# Patient Record
Sex: MALE | Race: WHITE
[De-identification: names, ages, dates, MRNs, and addresses within clinical notes are randomized per-mention and may not be internally consistent; named-entity substitution may affect disease eponyms.]

---

## 2017-09-13 ENCOUNTER — HOSPITAL ENCOUNTER (INPATIENT)
Dept: HOSPITAL 56 - MW.ED | Age: 32
LOS: 1 days | Discharge: NURSING, REHAB OR LONG-TERM CARE | DRG: 241 | End: 2017-09-14
Attending: INTERNAL MEDICINE | Admitting: INTERNAL MEDICINE
Payer: COMMERCIAL

## 2017-09-13 DIAGNOSIS — Z79.899: ICD-10-CM

## 2017-09-13 DIAGNOSIS — K21.9: ICD-10-CM

## 2017-09-13 DIAGNOSIS — E88.01: ICD-10-CM

## 2017-09-13 DIAGNOSIS — D50.0: ICD-10-CM

## 2017-09-13 DIAGNOSIS — R55: ICD-10-CM

## 2017-09-13 DIAGNOSIS — K29.81: Primary | ICD-10-CM

## 2017-09-13 LAB
CHLORIDE SERPL-SCNC: 110 MMOL/L (ref 98–110)
SODIUM SERPL-SCNC: 137 MMOL/L (ref 136–146)

## 2017-09-13 PROCEDURE — 30233N1 TRANSFUSION OF NONAUTOLOGOUS RED BLOOD CELLS INTO PERIPHERAL VEIN, PERCUTANEOUS APPROACH: ICD-10-PCS | Performed by: INTERNAL MEDICINE

## 2017-09-13 PROCEDURE — C9113 INJ PANTOPRAZOLE SODIUM, VIA: HCPCS

## 2017-09-13 PROCEDURE — P9016 RBC LEUKOCYTES REDUCED: HCPCS

## 2017-09-13 NOTE — EDM.PDOC
ED HPI GENERAL MEDICAL PROBLEM





- General


Chief Complaint: Abdominal Pain


Stated Complaint: stomach ulcers


Time Seen by Provider: 09/13/17 08:23





- History of Present Illness


INITIAL COMMENTS - FREE TEXT/NARRATIVE: 





HISTORY AND PHYSICAL:





History of present illness:


The patient is a 32-year-old male with a history of peptic ulcer disease since 

2007 and has had multiple endoscopies since that time and has had a blood 

transfusion and presents with complaints of black/maroon stools that started on 

Sunday, 3 days ago. The patient was seen in our clinic 2 days ago for similar 

symptoms and had a hemoglobin of 10.1. He has had multiple endoscopies at a 

variety of locations including [facility] in [place] here at [facility] 

and at the [facility]. He had a pill study performed earlier this year in 

January at [facility] and says that his provider in the clinic has all those 

results. He is currently on a regimen of omeprazole and sucralfate. The patient 

does not drink caffeinated products or take aspirin based products and does not 

drink alcohol. The patient says that starting on Sunday he had dark stools 

which when they were in the water in the toilet had blood from them. He's had 

no nausea or vomiting and no abdominal pain with this. He's had no chest pain 

or shortness of breath but today he was very sweaty and then he had a brief 

syncopal event. Patient denies any trauma as a result of the syncopal event. 

Patient has only had one bowel movement a day until yesterday and he had 2 

bowel movements and it is not diarrhea. Patient denies any neurosensory changes 

or weakness in his extremities but he says he looks very pale and he feels very 

weak in a generalized fashion. As a result of the syncope has no head neck or 

back pain and no extremity complaints. Currently in the ED laying in the bed he 

feels asymptomatic except for just some generalized weakness





Review of systems: 


As per history of present illness and below otherwise all systems reviewed and 

negative.





Past medical history: 


As per history of present illness and as reviewed below otherwise 

noncontributory.





Surgical history: 


As per history of present illness and as reviewed below otherwise 

noncontributory.





Social history: 


No reported history of drug or alcohol abuse.





Family history: 


As per history of present illness and as reviewed below otherwise 

noncontributory.





Physical exam:


Gen.: Well-developed well-nourished man who is very tall and lean and very pale 

appearing. He speaking clearly and easily in the ED and vital signs have been 

noted by me.


HEENT: Atraumatic, normocephalic, pupils reactive, negative for scleral icterus

, there is profound conjunctival pallor, mucous membranes moist, throat clear, 

neck supple, nontender, trachea midline. There are no midline step-offs in his 

defects of the cervical spine


Lungs: Clear to auscultation, breath sounds equal bilaterally, chest nontender.


Heart: S1S2, regular rhythm and slightly tachycardic rate on my evaluation, 

negative for clicks, rubs, or JVD.


Abdomen: Soft, nondistended, nontender. There is no rebound or guarding and 

bowel sounds are normoactive. There is absolutely no tenderness on palpation. 

Negative for masses or hepatosplenomegaly. Negative for costovertebral 

tenderness.


Pelvis: Stable nontender.


Genitourinary: Deferred.


Rectal: Deferred.


Extremities: Atraumatic, negative for cords or calf pain. Neurovascular 

unremarkable.


Neuro: Awake, alert, oriented. Cranial nerves II through XII unremarkable. 

Cerebellum unremarkable. Motor and sensory unremarkable throughout. Exam 

nonfocal.


Skin: Normal turgor, very pale in coloration and no rashes or lesions. There is 

no evidence of any soft tissue injury visible on the trunk or the extremities/

head and neck as a result of the syncope


Back: There are no midline step-offs in his defects of the thoracic or lumbar 

spine and no evidence of any abrasions contusions or skin changes


Diagnostics:


EKG, CBC CMP INR troponin chest x-ray type and screen/type and cross





Therapeutics:


IV O2 monitor IV fluids protonic bolus protonic drip blood transfusion





0910: This case was discussed with our surgeon on call Dr. Figueroa who feels 

that the patient can remain here, be admitted to the hospitalist service, 

received blood transfusion and he will plan on doing an endoscopy. He will do a 

formal consult later today. The patient is aware of this conversation and the 

care plan and is comfortable with that. I will discuss the case with the 

hospitalist.


0955: Dr. Byers is in the ER evaluating the patient and accepts them for 

inpatient admission to telemetry.





Critical care time excluding procedures:31min


Impression: 


Upper GI bleed with history of same, symptomatic anemia with syncope





Definitive disposition and diagnosis as appropriate pending reevaluation and 

review of above.





- Related Data


 Allergies











Allergy/AdvReac Type Severity Reaction Status Date / Time


 


No Known Allergies Allergy   Verified 09/13/17 08:17











Home Meds: 


 Home Meds





Albuterol Sulfate [Proair Hfa] 2 puff INH QID PRN 01/17/17 [History]


Omeprazole 40 mg PO DAILY 01/17/17 [History]


Sucralfate 1 tab PO QID 01/17/17 [History]











Past Medical History


HEENT History: Reports: Other (See Below)


Other HEENT History: wears glasses,  has upper and lower dentures (does not wear

)


Other Cardiovascular History: recent tachycardia from anemia


Respiratory History: Reports: SOB, Other (See Below)


Other Respiratory History: Alpha-1-antitrypsin deficiency,   has been SOB from 

recent anemia


Gastrointestinal History: Reports: GERD, PUD


Genitourinary History: Reports: None


Musculoskeletal History: Reports: None


Neurological History: Reports: None


Psychiatric History: Reports: None


Endocrine/Metabolic History: Reports: None


Hematologic History: Reports: Anemia, Blood Transfusion(s)


Other Hematologic History: recently had 2 units transfused


Immunologic History: Reports: None


Oncologic (Cancer) History: Reports: None


Dermatologic History: Reports: None





- Past Surgical History


Head Surgeries/Procedures: Reports: None


HEENT Surgical History: Reports: None


Cardiovascular Surgical History: Reports: None


Respiratory Surgical History: Reports: None


GI Surgical History: Reports: EGD


Male  Surgical History: Reports: None


Endocrine Surgical History: Reports: None


Neurological Surgical History: Reports: None


Musculoskeletal Surgical History: Reports: None


Oncologic Surgical History: Reports: None





Social & Family History





- Tobacco Use


Smoking Status *Q: Never Smoker





- Recreational Drug Use


Recreational Drug Use: No


Drug Use in Last 12 Months: No





ED ROS GENERAL





- Review of Systems


Review Of Systems: ROS reveals no pertinent complaints other than HPI.





ED EXAM, GENERAL





- Physical Exam


Exam: See Below (See dictation)





Course





- Vital Signs


Last Recorded V/S: 


 Last Vital Signs











Temp  36.0 C   09/13/17 08:18


 


Pulse  102 H  09/13/17 08:18


 


Resp  14   09/13/17 09:30


 


BP  111/65   09/13/17 09:30


 


Pulse Ox  100   09/13/17 09:30














- Orders/Labs/Meds


Orders: 


 Active Orders 24 hr











 Category Date Time Status


 


 Patient Status [ADT] Stat ADT  09/13/17 09:57 Ordered


 


 Cardiac Monitoring [RC] .AS DIRECTED Care  09/13/17 08:24 Active


 


 EKG Documentation Completion [RC] STAT Care  09/13/17 08:24 Active


 


 Notify Provider Consults [RC] ASDIRECTED Care  09/13/17 09:29 Active


 


 Oxygen Therapy, ED [RC] ASDIRECTED Care  09/13/17 08:24 Active


 


 Pulse Oximetry [RC] ASDIRECTED Care  09/13/17 08:24 Active


 


 Consult to Physician [CONS] Stat Cons  09/13/17 09:29 Active


 


 RED BLOOD CELLS LP [BBK] Stat Lab  09/13/17 08:45 Results


 


 TYPE AND SCREEN [BBK] Stat Lab  09/13/17 08:45 Results


 


 Pantoprazole [ProTONIX IV***] 80 mg Med  09/13/17 09:15 Active





 Sodium Chloride 0.9% [Normal Saline] 100 ml   





 IV Q10H   


 


 Sodium Chloride 0.9% [Saline Flush] Med  09/13/17 08:24 Active





 10 ml FLUSH ASDIRECTED PRN   


 


 Sodium Chloride 0.9% [Saline Flush] Med  09/13/17 08:24 Active





 2.5 ml FLUSH ASDIRECTED PRN   


 


 Saline Lock Insert [OM.PC] Stat Oth  09/13/17 08:24 Ordered


 


 Transfuse Red Blood Cells [COMM] Stat Oth  09/13/17 09:09 Ordered








 Medication Orders





Pantoprazole Sodium 80 mg/ (Sodium Chloride)  100 mls @ 10 mls/hr IV Q10H JEMMA


   Last Admin: 09/13/17 09:23  Dose: 10 mls/hr


Sodium Chloride (Saline Flush)  10 ml FLUSH ASDIRECTED PRN


   PRN Reason: Keep Vein Open


Sodium Chloride (Saline Flush)  2.5 ml FLUSH ASDIRECTED PRN


   PRN Reason: Keep Vein Open








Labs: 


 Laboratory Tests











  09/13/17 09/13/17 09/13/17 Range/Units





  08:45 08:45 08:45 


 


WBC  8.47    (4.0-11.0)  K/uL


 


RBC  2.16 L    (4.50-5.90)  M/uL


 


Hgb  6.6 L    (13.0-17.0)  g/dL


 


Hct  19.5 L    (38.0-50.0)  %


 


MCV  90.3    (80.0-98.0)  fL


 


MCH  30.6    (27.0-32.0)  pg


 


MCHC  33.8    (31.0-37.0)  g/dL


 


RDW Std Deviation  45.7    (28.0-62.0)  fl


 


RDW Coeff of Jairo  14    (11.0-15.0)  %


 


Plt Count  244    (150-400)  K/uL


 


MPV  9.50    (7.40-12.00)  fL


 


Neut % (Auto)  60.3    (48.0-80.0)  %


 


Lymph % (Auto)  31.6    (16.0-40.0)  %


 


Mono % (Auto)  3.7    (0.0-15.0)  %


 


Eos % (Auto)  2.6    (0.0-7.0)  %


 


Baso % (Auto)  1.8 H    (0.0-1.5)  %


 


Neut # (Auto)  5.1    (1.4-5.7)  K/uL


 


Lymph # (Auto)  2.7 H    (0.6-2.4)  K/uL


 


Mono # (Auto)  0.3    (0.0-0.8)  K/uL


 


Eos # (Auto)  0.2    (0.0-0.7)  K/uL


 


Baso # (Auto)  0.2 H    (0.0-0.1)  K/uL


 


Nucleated RBC %  0.0    /100WBC


 


Nucleated RBCs #  0    K/uL


 


INR   1.06   (0.86-1.11)  


 


Sodium    137  (136-146)  mmol/L


 


Potassium    3.7  (3.5-5.1)  mmol/L


 


Chloride    110  ()  mmol/L


 


Carbon Dioxide    22  (21-31)  mmol/L


 


BUN    37 H  (6.0-23.0)  mg/dL


 


Creatinine    0.9  (0.6-1.5)  mg/dL


 


Est Cr Clr Drug Dosing    138.23  mL/min


 


Estimated GFR (MDRD)    > 60.0  ml/min


 


Glucose    157 H  ()  mg/dL


 


Calcium    7.9 L  (8.8-10.8)  mg/dL


 


Total Bilirubin    0.2  (0.1-1.5)  mg/dL


 


AST    11  (5-40)  IU/L


 


ALT    10  (8-54)  IU/L


 


Alkaline Phosphatase    31 L  ()  


 


Troponin I     (0.0-0.29)  NG/ML


 


Total Protein    5.4 L  (6.0-8.0)  g/dL


 


Albumin    3.1 L  (3.5-5.0)  g/dL


 


Globulin    2.3  (2.0-3.5)  g/dL


 


Albumin/Globulin Ratio    1.4  (1.3-2.8)  


 


Blood Type     


 


Antibody Screen     


 


Crossmatch     














  09/13/17 09/13/17 Range/Units





  08:45 08:45 


 


WBC    (4.0-11.0)  K/uL


 


RBC    (4.50-5.90)  M/uL


 


Hgb    (13.0-17.0)  g/dL


 


Hct    (38.0-50.0)  %


 


MCV    (80.0-98.0)  fL


 


MCH    (27.0-32.0)  pg


 


MCHC    (31.0-37.0)  g/dL


 


RDW Std Deviation    (28.0-62.0)  fl


 


RDW Coeff of Jairo    (11.0-15.0)  %


 


Plt Count    (150-400)  K/uL


 


MPV    (7.40-12.00)  fL


 


Neut % (Auto)    (48.0-80.0)  %


 


Lymph % (Auto)    (16.0-40.0)  %


 


Mono % (Auto)    (0.0-15.0)  %


 


Eos % (Auto)    (0.0-7.0)  %


 


Baso % (Auto)    (0.0-1.5)  %


 


Neut # (Auto)    (1.4-5.7)  K/uL


 


Lymph # (Auto)    (0.6-2.4)  K/uL


 


Mono # (Auto)    (0.0-0.8)  K/uL


 


Eos # (Auto)    (0.0-0.7)  K/uL


 


Baso # (Auto)    (0.0-0.1)  K/uL


 


Nucleated RBC %    /100WBC


 


Nucleated RBCs #    K/uL


 


INR    (0.86-1.11)  


 


Sodium    (136-146)  mmol/L


 


Potassium    (3.5-5.1)  mmol/L


 


Chloride    ()  mmol/L


 


Carbon Dioxide    (21-31)  mmol/L


 


BUN    (6.0-23.0)  mg/dL


 


Creatinine    (0.6-1.5)  mg/dL


 


Est Cr Clr Drug Dosing    mL/min


 


Estimated GFR (MDRD)    ml/min


 


Glucose    ()  mg/dL


 


Calcium    (8.8-10.8)  mg/dL


 


Total Bilirubin    (0.1-1.5)  mg/dL


 


AST    (5-40)  IU/L


 


ALT    (8-54)  IU/L


 


Alkaline Phosphatase    ()  


 


Troponin I  < 0.10   (0.0-0.29)  NG/ML


 


Total Protein    (6.0-8.0)  g/dL


 


Albumin    (3.5-5.0)  g/dL


 


Globulin    (2.0-3.5)  g/dL


 


Albumin/Globulin Ratio    (1.3-2.8)  


 


Blood Type   A POSITIVE  


 


Antibody Screen   NEGATIVE  


 


Crossmatch   See Detail  











Meds: 


Medications











Generic Name Dose Route Start Last Admin





  Trade Name Freq  PRN Reason Stop Dose Admin


 


Pantoprazole Sodium 80 mg/  100 mls @ 10 mls/hr  09/13/17 09:15  09/13/17 09:23





  Sodium Chloride  IV   10 mls/hr





  Q10H JEMMA   Administration


 


Sodium Chloride  10 ml  09/13/17 08:24  





  Saline Flush  FLUSH   





  ASDIRECTED PRN   





  Keep Vein Open   


 


Sodium Chloride  2.5 ml  09/13/17 08:24  





  Saline Flush  FLUSH   





  ASDIRECTED PRN   





  Keep Vein Open   














Discontinued Medications














Generic Name Dose Route Start Last Admin





  Trade Name Freq  PRN Reason Stop Dose Admin


 


Sodium Chloride  1,000 mls @ 999 mls/hr  09/13/17 08:24  09/13/17 08:35





  Normal Saline  IV  09/13/17 09:24  999 mls/hr





  STAT ONE   Administration


 


Pantoprazole Sodium 80 mg/  100 mls @ 10 mls/hr  09/13/17 08:30  





  Sodium Chloride  IV   





  .Continuous JEMMA   


 


Pantoprazole Sodium  80 mg  09/13/17 08:24  09/13/17 09:01





  Protonix Iv***  IVPUSH  09/13/17 08:25  80 mg





  .BOLUS ONE   Administration














Departure





- Departure


Time of Disposition: 10:00


Disposition: Admitted As Inpatient 64


Condition: Good


Clinical Impression: 


 Upper GI bleeding, Symptomatic anemia








- Discharge Information


Referrals: 


Nahun Thayer MD [Primary Care Provider] - 


Forms:  ED Department Discharge





- My Orders


Last 24 Hours: 


My Active Orders





09/13/17 08:24


Cardiac Monitoring [RC] .AS DIRECTED 


EKG Documentation Completion [RC] STAT 


Oxygen Therapy, ED [RC] ASDIRECTED 


Pulse Oximetry [RC] ASDIRECTED 


Sodium Chloride 0.9% [Saline Flush]   10 ml FLUSH ASDIRECTED PRN 


Sodium Chloride 0.9% [Saline Flush]   2.5 ml FLUSH ASDIRECTED PRN 


Saline Lock Insert [OM.PC] Stat 





09/13/17 08:45


RED BLOOD CELLS LP [BBK] Stat 


TYPE AND SCREEN [BBK] Stat 





09/13/17 09:09


Transfuse Red Blood Cells [COMM] Stat 





09/13/17 09:15


Pantoprazole [ProTONIX IV***] 80 mg   Sodium Chloride 0.9% [Normal Saline] 100 

ml IV Q10H 





09/13/17 09:29


Notify Provider Consults [RC] ASDIRECTED 


Consult to Physician [CONS] Stat 





09/13/17 09:57


Patient Status [ADT] Stat 














- Assessment/Plan


Last 24 Hours: 


My Active Orders





09/13/17 08:24


Cardiac Monitoring [RC] .AS DIRECTED 


EKG Documentation Completion [RC] STAT 


Oxygen Therapy, ED [RC] ASDIRECTED 


Pulse Oximetry [RC] ASDIRECTED 


Sodium Chloride 0.9% [Saline Flush]   10 ml FLUSH ASDIRECTED PRN 


Sodium Chloride 0.9% [Saline Flush]   2.5 ml FLUSH ASDIRECTED PRN 


Saline Lock Insert [OM.PC] Stat 





09/13/17 08:45


RED BLOOD CELLS LP [BBK] Stat 


TYPE AND SCREEN [BBK] Stat 





09/13/17 09:09


Transfuse Red Blood Cells [COMM] Stat 





09/13/17 09:15


Pantoprazole [ProTONIX IV***] 80 mg   Sodium Chloride 0.9% [Normal Saline] 100 

ml IV Q10H 





09/13/17 09:29


Notify Provider Consults [RC] ASDIRECTED 


Consult to Physician [CONS] Stat 





09/13/17 09:57


Patient Status [ADT] Stat

## 2017-09-13 NOTE — PCM.HP
H&P History of Present Illness





- General


Admit Problem/Dx: 


 Admission Diagnosis/Problem





Admission Diagnosis/Problem      Upper gastrointestinal hemorrhage





Hemoglobin 6.6 g/dL, syncopal episode


Source of Information: Patient, Family


History Limitations: Reports: No Limitations





- History of Present Illness


Onset of Symptoms: Reports: Gradual


Duration of Symptoms: Reports: Day(s):, Chronic


Location: Reports: Abdomen


Severity: Mild


Improves with: Reports: None


Worsens with: Reports: None


Associated Symptoms: Reports: Syncope, Other (Dizziness)





- Related Data


Allergies/Adverse Reactions: 


 Allergies











Allergy/AdvReac Type Severity Reaction Status Date / Time


 


No Known Allergies Allergy   Verified 09/13/17 08:17











Home Medications: 


 Home Meds





Albuterol Sulfate [Proair Hfa] 2 puff INH QID PRN 01/17/17 [History]


Omeprazole 40 mg PO DAILY 01/17/17 [History]


Sucralfate 1 tab PO QID 01/17/17 [History]











Past Medical History


HEENT History: Reports: Other (See Below)


Other HEENT History: wears glasses,  has upper and lower dentures (does not wear

)


Cardiovascular History: Reports: None


Other Cardiovascular History: recent tachycardia from anemia


Respiratory History: Reports: SOB, Other (See Below)


Other Respiratory History: Alpha-1-antitrypsin deficiency,   has been SOB from 

recent anemia


Gastrointestinal History: Reports: GERD, GI Bleed, PUD


Genitourinary History: Reports: None


Musculoskeletal History: Reports: None


Neurological History: Reports: None


Psychiatric History: Reports: None


Endocrine/Metabolic History: Reports: None


Hematologic History: Reports: Anemia, Blood Transfusion(s)


Other Hematologic History: recently had 2 units transfused


Immunologic History: Reports: None


Oncologic (Cancer) History: Reports: None


Dermatologic History: Reports: None





- Past Surgical History


Head Surgeries/Procedures: Reports: None


HEENT Surgical History: Reports: None


Cardiovascular Surgical History: Reports: None


Respiratory Surgical History: Reports: None


GI Surgical History: Reports: EGD


Male  Surgical History: Reports: None


Endocrine Surgical History: Reports: None


Neurological Surgical History: Reports: None


Musculoskeletal Surgical History: Reports: None


Oncologic Surgical History: Reports: None





Social & Family History





- Family History


Family Medical History: Noncontributory





- Tobacco Use


Smoking Status *Q: Never Smoker





- Caffeine Use


Caffeine Use: Reports: None





- Alcohol Use


Alcohol Use History: Yes


Alcohol Use Frequency: Rarely





- Recreational Drug Use


Recreational Drug Use: No


Drug Use in Last 12 Months: No





H&P Review of Systems





- Review of Systems:


Review Of Systems: See Below


General: Reports: Weakness, Fatigue


HEENT: Reports: No Symptoms


Pulmonary: Reports: Shortness of Breath


Cardiovascular: Reports: Syncope


Gastrointestinal: Reports: Black Stool, Bloody Stool, Decreased Appetite, Nausea


Genitourinary: Reports: No Symptoms


Musculoskeletal: Reports: No Symptoms


Skin: Reports: No Symptoms


Psychiatric: Reports: No Symptoms


Neurological: Reports: Dizziness


Hematologic/Lymphatic: Reports: Anemia, Easy Bleeding


Immunologic: Reports: No Symptoms





Exam





- Exam


Exam: See Below





- Vital Signs


Vital Signs: 


 Last Vital Signs











Temp  36.6 C   09/13/17 10:04


 


Pulse  102 H  09/13/17 08:18


 


Resp  18   09/13/17 10:04


 


BP  130/65   09/13/17 10:04


 


Pulse Ox  98   09/13/17 10:04











Weight: 90.718 kg





- Exam


Quality Assessment: No: Supplemental Oxygen


General: Alert, Oriented, Cooperative


HEENT: Conjunctiva Clear, EOMI.  No: Mucosa Moist & Pink (Pale conjunctiva and 

oral mucosa)


Neck: Supple, Trachea Midline


Lungs: Clear to Auscultation, Normal Respiratory Effort


Cardiovascular: Regular Rhythm, Tachycardia


GI/Abdominal Exam: Normal Bowel Sounds, Soft, Non-Tender, No Distention


Rectal (Males) Exam: Black Stool


Back Exam: Normal Inspection


Extremities: Normal Inspection, No Pedal Edema


Skin: Warm, Dry


Neurological: Cranial Nerves Intact


Neuro Extensive - Mental Status: Alert, Oriented x3


Psychiatric: Alert, Normal Affect, Normal Mood





- Patient Data


Result Diagrams: 


 09/13/17 08:45





 09/13/17 08:45





*Q Meaningful Use (ADM)





- VTE *Q


VTE Criteria *Q: 





VTE Pharmacological Contraindications *Q: Active Hemorrhage





- Stroke *Q


Stroke Criteria *Q: 








- AMI *Q


AMI Criteria *Q: 








- Problem List


(1) Duodenitis with hemorrhage


SNOMED Code(s): 14671472


   ICD Code: K29.81 - DUODENITIS WITH BLEEDING   Status: Chronic   Priority: 

High   Current Visit: Yes   





(2) Chronic hemorrhagic anemia


SNOMED Code(s): 984608177


   ICD Code: D50.0 - IRON DEFICIENCY ANEMIA SECONDARY TO BLOOD LOSS (CHRONIC)   

Status: Chronic   Priority: High   Current Visit: Yes   





(3) Syncope and collapse


SNOMED Code(s): 220784432


   ICD Code: R55 - SYNCOPE AND COLLAPSE   Status: Acute   Priority: High   

Current Visit: Yes   


Problem List Initiated/Reviewed/Updated: Yes


Orders Last 24hrs: 


 Active Orders 24 hr











 Category Date Time Status


 


 Patient Status [ADT] Routine ADT  09/13/17 10:18 Active


 


 Oxygen Therapy [RC] PRN Care  09/13/17 10:18 Active


 


 Up With Assistance [RC] ASDIRECTED Care  09/13/17 10:18 Active


 


 VTE/DVT Education [RC] PER UNIT ROUTINE Care  09/13/17 10:18 Active


 


 Vital Signs [RC] Q4H Care  09/13/17 10:18 Active


 


 Clear Liquid Diet [DIET] Diet  09/13/17 Lunch Active


 


 Acetaminophen [Tylenol] Med  09/13/17 10:18 Active





 650 mg PO Q4H PRN   


 


 Albuterol [Ventolin HFA] Med  09/13/17 10:22 Active





 8 gm INH QID PRN   


 


 Morphine Med  09/13/17 10:18 Active





 2 mg IVPUSH Q2H PRN   


 


 Ondansetron [Zofran ODT] Med  09/13/17 10:18 Active





 8 mg PO Q6H PRN   


 


 Sodium Chloride 0.9% [Normal Saline] 1,000 ml Med  09/13/17 10:30 Active





 IV ASDIRECTED   


 


 Temazepam [Restoril] Med  09/13/17 10:18 Active





 15 mg PO BEDTIME PRN   


 


 oxyCODONE Med  09/13/17 10:18 Active





 5 mg PO Q4H PRN   


 


 VTE Pharmacological Contraindications [AST] Per Unit Oth  09/13/17 10:18 

Ordered





 Routine   


 


 Resuscitation Status Routine Resus Stat  09/13/17 10:18 Ordered








 Medication Orders





Acetaminophen (Tylenol)  650 mg PO Q4H PRN


   PRN Reason: Pain (Mild 1-3)/fever


Albuterol (Ventolin Hfa)  8 gm INH QID PRN


   PRN Reason: Shortness of Breath


Pantoprazole Sodium 80 mg/ (Sodium Chloride)  100 mls @ 10 mls/hr IV Q10H JEMMA


   Last Admin: 09/13/17 09:23  Dose: 10 mls/hr


Sodium Chloride (Normal Saline)  1,000 mls @ 125 mls/hr IV ASDIRECTED JEMMA


Morphine Sulfate (Morphine)  2 mg IVPUSH Q2H PRN


   PRN Reason: Pain (severe 7-10)


   Stop: 09/14/17 10:21


Ondansetron HCl (Zofran Odt)  8 mg PO Q6H PRN


   PRN Reason: nausea, able to take PO


Oxycodone HCl (Oxycodone)  5 mg PO Q4H PRN


   PRN Reason: Pain (moderate 4-6)


Sodium Chloride (Saline Flush)  10 ml FLUSH ASDIRECTED PRN


   PRN Reason: Keep Vein Open


Sodium Chloride (Saline Flush)  2.5 ml FLUSH ASDIRECTED PRN


   PRN Reason: Keep Vein Open


Temazepam (Restoril)  15 mg PO BEDTIME PRN


   PRN Reason: Sleep








Assessment/Plan Comment:: 





The patient is a 32-year-old gentleman who is presented primarily out of 

concern for syncopal episode. The patient's hemoglobin on presentation to the 

emergency department was 6.6 g/dL. Surgeon, Dr. Figueroa, has been consulted. 

The patient will be admitted as an inpatient on telemetry. He'll be transfused 

with 2 units packed red blood cells. The patient will likely have possible 

intervention as far as endoscopy and/or colonoscopy. The patient will be 

maintained on proton pump inhibitor drip. I have also reviewed the patient's 

previous capsule endoscopy which was obtained in January 2017 which shows 

severe ulcerative duodenitis in the first portion of duodenum with a healing 

ulcer. Patient says that he has been compliant with his medications. The 

patient will also be maintained on fluids and a clear liquid diet for now. The 

patient's hemoglobin and hematocrit will be monitored for further drops in his 

hemoglobin and he'll be transfused as needed.

## 2017-09-13 NOTE — PCM.CONS
H&P History of Present Illness





- General


Date of Service: 09/13/17


Admit Problem/Dx: 


 Admission Diagnosis/Problem





Admission Diagnosis/Problem      Upper gastrointestinal hemorrhage





Hemoglobin 6.6 g/dL, syncopal episode


Source of Information: Patient


History Limitations: Reports: No Limitations





- History of Present Illness


Onset of Symptoms: Reports: Gradual


Symptom Onset Date: 09/10/17


Location: Reports: Abdomen


Quality: Reports: Same as Previous Episode


Severity: Moderate


Improves with: Reports: None


Worsens with: Reports: None


Context: Reports: Sick Contact


Associated Symptoms: Reports: Nausea/Vomiting, Other (Hematemesis, melena)





- Related Data


Allergies/Adverse Reactions: 


 Allergies











Allergy/AdvReac Type Severity Reaction Status Date / Time


 


No Known Allergies Allergy   Verified 09/13/17 08:17











Home Medications: 


 Home Meds





Albuterol Sulfate [Proair Hfa] 2 puff INH QID PRN 01/17/17 [History]


Omeprazole 40 mg PO DAILY 01/17/17 [History]


Sucralfate 1 tab PO QID 01/17/17 [History]











Past Medical History


HEENT History: Reports: Other (See Below)


Other HEENT History: wears glasses,  has upper and lower dentures (does not wear

)


Cardiovascular History: Reports: None


Other Cardiovascular History: recent tachycardia from anemia


Respiratory History: Reports: SOB, Other (See Below)


Other Respiratory History: Alpha-1-antitrypsin deficiency,   has been SOB from 

recent anemia


Gastrointestinal History: Reports: GERD, GI Bleed, PUD


Genitourinary History: Reports: None


Musculoskeletal History: Reports: None


Neurological History: Reports: None


Psychiatric History: Reports: None


Endocrine/Metabolic History: Reports: None


Hematologic History: Reports: Anemia, Blood Transfusion(s)


Other Hematologic History: recently had 2 units transfused


Immunologic History: Reports: None


Oncologic (Cancer) History: Reports: None


Dermatologic History: Reports: None





- Past Surgical History


Head Surgeries/Procedures: Reports: None


HEENT Surgical History: Reports: None


Cardiovascular Surgical History: Reports: None


Respiratory Surgical History: Reports: None


GI Surgical History: Reports: EGD


Male  Surgical History: Reports: None


Endocrine Surgical History: Reports: None


Neurological Surgical History: Reports: None


Musculoskeletal Surgical History: Reports: None


Oncologic Surgical History: Reports: None





Social & Family History





- Family History


Family Medical History: Noncontributory





- Tobacco Use


Smoking Status *Q: Never Smoker





- Caffeine Use


Caffeine Use: Reports: None





- Recreational Drug Use


Recreational Drug Use: No


Drug Use in Last 12 Months: No





H&P Review of Systems





- Review of Systems:


Review Of Systems: See Below


General: Reports: Weakness, Fatigue.  Denies: Fever, Chills, Night Sweats, 

Diaphoresis, Weight Loss


HEENT: Reports: No Symptoms


Pulmonary: Reports: No Symptoms


Cardiovascular: Reports: No Symptoms


Gastrointestinal: Reports: Black Stool, Hematemesis, Nausea, Vomiting.  Denies: 

Constipation, Diarrhea


Genitourinary: Reports: No Symptoms


Musculoskeletal: Reports: No Symptoms


Skin: Reports: No Symptoms


Psychiatric: Reports: No Symptoms


Neurological: Reports: No Symptoms


Hematologic/Lymphatic: Reports: Anemia





Exam





- Exam


Exam: See Below





- Vital Signs


Vital Signs: 


 Last Vital Signs











Temp  98.4 F   09/13/17 10:19


 


Pulse  92   09/13/17 10:35


 


Resp  18   09/13/17 10:35


 


BP  122/60   09/13/17 10:35


 


Pulse Ox  100   09/13/17 10:35











Weight: 200 lb





- Exam


Quality Assessment: Supplemental Oxygen


General: Alert, Oriented, Cooperative, Mild Distress


HEENT: Conjunctiva Clear, Nares Patent.  No: Scleral Icterus


Neck: Supple, Trachea Midline


Lungs: Clear to Auscultation, Normal Respiratory Effort


Cardiovascular: Regular Rate, Regular Rhythm, Normal S1, Normal S2.  No: 

Tachycardia, Systolic Murmur


GI/Abdominal Exam: Normal Bowel Sounds, Soft, Non-Tender


 (Male) Exam: No Hernia


Rectal (Males) Exam: Deferred


Back Exam: Normal Inspection


Extremities: Normal Inspection


Peripheral Pulses: 4+: Posterior Tibial (L), Posterior Tibial (R), Dorsalis 

Pedis (L), Dorsalis Pedis (R)


Skin: Warm, Dry, Intact


Neurological: Cranial Nerves Intact


Psychiatric: Alert, Normal Affect, Normal Mood





- Patient Data


Result Diagrams: 


 09/13/17 08:45





 09/13/17 08:45





Consult PN Assessment/Plan


Procedures: 


Procedures





ASSAY OF IRON (02/01/17)


BLOOD TRANSFUSION SERVICE (01/12/17)


BLOOD TYPING SEROLOGIC ABO (01/12/17)


BLOOD TYPING SEROLOGIC RH(D) (01/12/17)


COMPATIBILITY TEST ANTIGLOB (01/12/17)


COMPATIBILITY TEST INCUBATE (01/12/17)


COMPATIBILITY TEST SPIN (01/12/17)


COMPLETE CBC W/AUTO DIFF WBC (09/11/17)


COMPREHEN METABOLIC PANEL (01/10/17)


EGD BIOPSY SINGLE/MULTIPLE (01/20/17)


HEMATOCRIT (01/20/17)


HEMOGLOBIN (01/20/17)


RBC ANTIBODY SCREEN (01/12/17)


ROUTINE VENIPUNCTURE (09/11/17)








(1) Symptomatic anemia


SNOMED Code(s): 635027660


   Code(s): D64.9 - ANEMIA, UNSPECIFIED   Priority: High   Current Visit: Yes   





(2) Syncope and collapse


SNOMED Code(s): 965323415


   Code(s): R55 - SYNCOPE AND COLLAPSE   Priority: High   Current Visit: Yes   





(3) Upper GI bleeding


SNOMED Code(s): 48857831


   Code(s): K92.2 - GASTROINTESTINAL HEMORRHAGE, UNSPECIFIED   Priority: High   

Current Visit: Yes   


Problem List Initiated/Reviewed/Updated: Yes


Plan: 





Esophagogastroduodenoscopy with biopsy. The operative procedure, along with the 

risks, including, but not limited to, bleeding, perforation, and the need for 

surgery were discussed with the patient who voices understanding, offers no 

questions and wishes to proceed.

## 2017-09-14 VITALS — DIASTOLIC BLOOD PRESSURE: 62 MMHG | SYSTOLIC BLOOD PRESSURE: 119 MMHG

## 2017-09-14 LAB
CHLORIDE SERPL-SCNC: 113 MMOL/L (ref 98–110)
SODIUM SERPL-SCNC: 142 MMOL/L (ref 136–146)

## 2017-09-14 PROCEDURE — 0DB68ZX EXCISION OF STOMACH, VIA NATURAL OR ARTIFICIAL OPENING ENDOSCOPIC, DIAGNOSTIC: ICD-10-PCS | Performed by: SURGERY

## 2017-09-14 NOTE — PCM.DCSUM1
**Discharge Summary





- Hospital Course


Free Text/Narrative:: 





Syncope with symptomatic anemia and upper GI bleed.





- Discharge Data


Discharge Date: 09/14/17


Discharge Disposition: DC/Tfer to Acute Hospital 02


Condition: Fair





- Discharge Diagnosis/Problem(s)


(1) Duodenitis with hemorrhage


SNOMED Code(s): 55238193


   ICD Code: K29.81 - DUODENITIS WITH BLEEDING   Status: Chronic   Priority: 

High   Current Visit: Yes   





(2) Chronic hemorrhagic anemia


SNOMED Code(s): 107935079


   ICD Code: D50.0 - IRON DEFICIENCY ANEMIA SECONDARY TO BLOOD LOSS (CHRONIC)   

Status: Chronic   Priority: High   Current Visit: Yes   





(3) Syncope and collapse


SNOMED Code(s): 637829757


   ICD Code: R55 - SYNCOPE AND COLLAPSE   Status: Acute   Priority: High   

Current Visit: Yes   





- Patient Summary/Data


Operative Procedure(s) Performed: Esophagogastroduodenoscopy with antral 

biopsies


Hospital Course: 





The patient is a 32-year-old gentleman who has a history of peptic ulcer 

disease and multiple endoscopies as well as endoscopic cameras had been 

presenting with dizziness or lightheadedness and also has a history of black 

and maroon stools. The patient came into the emergency department and was noted 

to have a hemoglobin of 6.6 g/dL. The patient has had a history of multiple 

endoscopies including colonoscopies at both [place] and at the [facility]. 

Nothing had been ever found. The patient on presentation was noted to have 

black tarry stools. Today the patient had an endoscopy performed by surgeon, 

Dr. Figueroa, which did not show any specific evidence of possible bleed. The 

patient had been transfused with 2 units packed red blood cells and his 

hemoglobin has not responded appropriately. His current hemoglobin is at 7.2 g/

dL. Patient still is having some concerns about bleeding and dark tarry stools. 

The patient previously had a syncopal episode which had brought him to the 

emergency room initially. I've recommended that the patient be transferred to 

tertiary care center for specialized care. I spoken to [facility] in [place] 

and accepting physician is Dr. Gaitan. The patient is currently hemodynamically 

stable and his vital signs remained stable. The patient will be transported via 

ground ambulance to tertiary [facility].





- Patient Instructions


Diet: NPO





- Discharge Plan


Home Medications: 


 Home Meds





Albuterol Sulfate [Proair Hfa] 2 puff INH QID PRN 01/17/17 [History]


Omeprazole 40 mg PO DAILY 01/17/17 [History]


Sucralfate 1 tab PO QID 01/17/17 [History]








Forms:  ED Department Discharge


Referrals: 


Nahun Thayer MD [Primary Care Provider] - 





- Discharge Summary/Plan Comment


DC Time >30 min.: Yes





- Patient Data


Vitals - Most Recent: 


 Last Vital Signs











Temp  36.9 C   09/14/17 08:36


 


Pulse  83   09/14/17 08:36


 


Resp  20   09/14/17 08:36


 


BP  107/57 L  09/14/17 08:36


 


Pulse Ox  100   09/14/17 08:36











Weight - Most Recent: 90.718 kg


I&O - Last 24 hours: 


 Intake & Output











 09/13/17 09/14/17 09/14/17





 22:59 06:59 14:59


 


Intake Total 1487 1116 


 


Output Total 1950 1075 


 


Balance -463 41 











Lab Results - Last 24 hrs: 


 Laboratory Results - last 24 hr











  09/14/17 09/14/17 Range/Units





  04:30 04:30 


 


WBC  5.68   (4.0-11.0)  K/uL


 


RBC  2.35 L   (4.50-5.90)  M/uL


 


Hgb  7.2 L   (13.0-17.0)  g/dL


 


Hct  21.1 L   (38.0-50.0)  %


 


MCV  89.8   (80.0-98.0)  fL


 


MCH  30.6   (27.0-32.0)  pg


 


MCHC  34.1   (31.0-37.0)  g/dL


 


RDW Std Deviation  46.7   (28.0-62.0)  fl


 


RDW Coeff of Jairo  14   (11.0-15.0)  %


 


Plt Count  189   (150-400)  K/uL


 


MPV  9.50   (7.40-12.00)  fL


 


Neut % (Auto)  53.1   (48.0-80.0)  %


 


Lymph % (Auto)  31.5   (16.0-40.0)  %


 


Mono % (Auto)  7.2   (0.0-15.0)  %


 


Eos % (Auto)  6.3   (0.0-7.0)  %


 


Baso % (Auto)  1.9 H   (0.0-1.5)  %


 


Neut # (Auto)  3.0   (1.4-5.7)  K/uL


 


Lymph # (Auto)  1.8   (0.6-2.4)  K/uL


 


Mono # (Auto)  0.4   (0.0-0.8)  K/uL


 


Eos # (Auto)  0.4   (0.0-0.7)  K/uL


 


Baso # (Auto)  0.1   (0.0-0.1)  K/uL


 


Nucleated RBC %  0.0   /100WBC


 


Nucleated RBCs #  0   K/uL


 


Sodium   142  (136-146)  mmol/L


 


Potassium   3.8  (3.5-5.1)  mmol/L


 


Chloride   113 H  ()  mmol/L


 


Carbon Dioxide   25  (21-31)  mmol/L


 


BUN   22  (6.0-23.0)  mg/dL


 


Creatinine   0.9  (0.6-1.5)  mg/dL


 


Est Cr Clr Drug Dosing   138.96  mL/min


 


Estimated GFR (MDRD)   > 60.0  ml/min


 


Glucose   102  ()  mg/dL


 


Calcium   8.0 L  (8.8-10.8)  mg/dL











Med Orders - Current: 


 Current Medications





Acetaminophen (Tylenol)  650 mg PO Q4H PRN


   PRN Reason: Pain (Mild 1-3)/fever


   Last Admin: 09/13/17 20:22 Dose:  650 mg


Albuterol (Ventolin Hfa)  8 gm INH QID PRN


   PRN Reason: Shortness of Breath


Pantoprazole Sodium 80 mg/ (Sodium Chloride)  100 mls @ 10 mls/hr IV Q10H [facility]


   Last Admin: 09/14/17 08:01 Dose:  Not Given


Sodium Chloride (Normal Saline)  1,000 mls @ 125 mls/hr IV ASDIRECTED [facility]


   Last Admin: 09/14/17 02:54 Dose:  125 mls/hr


Morphine Sulfate (Morphine)  2 mg IVPUSH Q2H PRN


   PRN Reason: Pain (severe 7-10)


Ondansetron HCl (Zofran Odt)  8 mg PO Q6H PRN


   PRN Reason: nausea, able to take PO


Oxycodone HCl (Oxycodone)  5 mg PO Q4H PRN


   PRN Reason: Pain (moderate 4-6)


Sodium Chloride (Saline Flush)  10 ml FLUSH ASDIRECTED PRN


   PRN Reason: Keep Vein Open


Sodium Chloride (Saline Flush)  2.5 ml FLUSH ASDIRECTED PRN


   PRN Reason: Keep Vein Open


Temazepam (Restoril)  15 mg PO BEDTIME PRN


   PRN Reason: Sleep





Discontinued Medications





Fentanyl (Sublimaze) Confirm Administered Dose 100 mcg .ROUTE .STK-MED ONE


   Stop: 09/14/17 07:02


Sodium Chloride (Normal Saline)  1,000 mls @ 999 mls/hr IV STAT ONE


   Stop: 09/13/17 09:24


   Last Admin: 09/13/17 08:35 Dose:  999 mls/hr


Pantoprazole Sodium 80 mg/ (Sodium Chloride)  100 mls @ 10 mls/hr IV 

.Continuous JEMMA


Lidocaine HCl (Xylocaine-Mpf 1%) Confirm Administered Dose 5 ml .ROUTE .STK-MED 

ONE


   Stop: 09/14/17 07:04


Midazolam HCl (Versed 1 Mg/Ml) Confirm Administered Dose 2 mg .ROUTE .STK-MED 

ONE


   Stop: 09/14/17 07:02


Ondansetron HCl (Zofran) Confirm Administered Dose 4 mg .ROUTE .STK-MED ONE


   Stop: 09/14/17 07:02


Pantoprazole Sodium (Protonix Iv***)  80 mg IVPUSH .BOLUS ONE


   Stop: 09/13/17 08:25


   Last Admin: 09/13/17 09:01 Dose:  80 mg


Propofol (Diprivan  20 Ml) Confirm Administered Dose 200 mg .ROUTE .STK-MED ONE


   Stop: 09/14/17 07:02


Propofol (Diprivan  20 Ml) Confirm Administered Dose 200 mg .ROUTE .STK-MED ONE


   Stop: 09/14/17 08:04











*Q Meaningful Use (DIS)





- VTE *Q


VTE Criteria *Q: 





VTE Pharmacological Contraindications *Q: Active Hemorrhage





- Stroke *Q


Stroke Criteria *Q: 








- AMI *Q


AMI Criteria *Q:

## 2017-09-14 NOTE — PCM.PREANE
Preanesthetic Assessment





- Procedure


Proposed Procedure: 





EGD





- Anesthesia/Transfusion/Family Hx


Anesthesia History: Prior Anesthesia Without Reaction


Family History of Anesthesia Reaction: No


Transfusion History: Prior Transfusion Without Reaction


Intubation History: Unknown





- Review of Systems


General: Weakness


Pulmonary: Shortness of Breath, Other (alpha 1 antitrypsin)


Cardiovascular: Other (anemic - undetermined etiology)


Neurological: No Symptoms


Other: Reports: None





- Physical Assessment


NPO Status Date: 09/13/17


NPO Status Time: 23:00


O2 Sat by Pulse Oximetry: 97


Respiratory Rate: 19


Temperature: 99.7 F


Vital Signs: 





 Last Vital Signs











Temp  98.2 F   09/14/17 02:00


 


Pulse  86   09/14/17 02:00


 


Resp  19   09/14/17 02:00


 


BP  118/62   09/14/17 02:00


 


Pulse Ox  97   09/14/17 02:00











Height: 6 ft 3.2 in


Weight: 200 lb


ASA Class: 3


Mental Status: Alert & Oriented x3


Airway Class: Mallampati = 1


Dentition: Reports: Normal Dentition


Thyro-Mental Finger Breadths: 3


Mouth Opening Finger Breadths: 3


ROM/Head Extension: Full


Lungs: Clear to Auscultation, Normal Respiratory Effort


Cardiovascular: Regular Rhythm, No Murmurs, Tachycardia





- Lab


Values: 





 Laboratory Last Values











WBC  5.68 K/uL (4.0-11.0)   09/14/17  04:30    


 


RBC  2.35 M/uL (4.50-5.90)  L  09/14/17  04:30    


 


Hgb  7.2 g/dL (13.0-17.0)  L  09/14/17  04:30    


 


Hct  21.1 % (38.0-50.0)  L  09/14/17  04:30    


 


MCV  89.8 fL (80.0-98.0)   09/14/17  04:30    


 


MCH  30.6 pg (27.0-32.0)   09/14/17  04:30    


 


MCHC  34.1 g/dL (31.0-37.0)   09/14/17  04:30    


 


RDW Std Deviation  46.7 fl (28.0-62.0)   09/14/17  04:30    


 


RDW Coeff of Jairo  14 % (11.0-15.0)   09/14/17  04:30    


 


Plt Count  189 K/uL (150-400)   09/14/17  04:30    


 


MPV  9.50 fL (7.40-12.00)   09/14/17  04:30    


 


Neut % (Auto)  53.1 % (48.0-80.0)   09/14/17  04:30    


 


Lymph % (Auto)  31.5 % (16.0-40.0)   09/14/17  04:30    


 


Mono % (Auto)  7.2 % (0.0-15.0)   09/14/17  04:30    


 


Eos % (Auto)  6.3 % (0.0-7.0)   09/14/17  04:30    


 


Baso % (Auto)  1.9 % (0.0-1.5)  H  09/14/17  04:30    


 


Neut # (Auto)  3.0 K/uL (1.4-5.7)   09/14/17  04:30    


 


Lymph # (Auto)  1.8 K/uL (0.6-2.4)   09/14/17  04:30    


 


Mono # (Auto)  0.4 K/uL (0.0-0.8)   09/14/17  04:30    


 


Eos # (Auto)  0.4 K/uL (0.0-0.7)   09/14/17  04:30    


 


Baso # (Auto)  0.1 K/uL (0.0-0.1)   09/14/17  04:30    


 


Nucleated RBC %  0.0 /100WBC  09/14/17  04:30    


 


Nucleated RBCs #  0 K/uL  09/14/17  04:30    


 


INR  1.06  (0.86-1.11)   09/13/17  08:45    


 


Sodium  142 mmol/L (136-146)   09/14/17  04:30    


 


Potassium  3.8 mmol/L (3.5-5.1)   09/14/17  04:30    


 


Chloride  113 mmol/L ()  H  09/14/17  04:30    


 


Carbon Dioxide  25 mmol/L (21-31)   09/14/17  04:30    


 


BUN  22 mg/dL (6.0-23.0)   09/14/17  04:30    


 


Creatinine  0.9 mg/dL (0.6-1.5)   09/14/17  04:30    


 


Est Cr Clr Drug Dosing  138.96 mL/min  09/14/17  04:30    


 


Estimated GFR (MDRD)  > 60.0 ml/min  09/14/17  04:30    


 


Glucose  102 mg/dL ()   09/14/17  04:30    


 


Calcium  8.0 mg/dL (8.8-10.8)  L  09/14/17  04:30    


 


Total Bilirubin  0.2 mg/dL (0.1-1.5)   09/13/17  08:45    


 


AST  11 IU/L (5-40)   09/13/17  08:45    


 


ALT  10 IU/L (8-54)   09/13/17  08:45    


 


Alkaline Phosphatase  31  ()  L  09/13/17  08:45    


 


Troponin I  < 0.10 NG/ML (0.0-0.29)   09/13/17  08:45    


 


Total Protein  5.4 g/dL (6.0-8.0)  L  09/13/17  08:45    


 


Albumin  3.1 g/dL (3.5-5.0)  L  09/13/17  08:45    


 


Globulin  2.3 g/dL (2.0-3.5)   09/13/17  08:45    


 


Albumin/Globulin Ratio  1.4  (1.3-2.8)   09/13/17  08:45    


 


Blood Type  A POSITIVE   09/13/17  08:45    


 


Antibody Screen  NEGATIVE   09/13/17  08:45    


 


Crossmatch  See Detail   09/13/17  08:45    














- Allergies


Allergies/Adverse Reactions: 


 Allergies











Allergy/AdvReac Type Severity Reaction Status Date / Time


 


No Known Allergies Allergy   Verified 09/13/17 08:17














- Blood


Blood Available: Yes


Product(s) Available: PRBC





- Anesthesia Plan


Pre-Op Medication Ordered: None





- Acknowledgements


Anesthesia Type Planned: MAC


Pt an Appropriate Candidate for the Planned Anesthesia: Yes


Alternatives and Risks of Anesthesia Discussed w Pt/Guardian: Yes


Pt/Guardian Understands and Agrees with Anesthesia Plan: Yes





PreAnesthesia Questionnaire


HEENT History: Reports: Other (See Below)


Other HEENT History: wears glasses,  has upper and lower dentures (does not wear

)


Cardiovascular History: Reports: None


Other Cardiovascular History: recent tachycardia from anemia


Respiratory History: Reports: SOB, Other (See Below)


Other Respiratory History: Alpha-1-antitrypsin deficiency,   has been SOB from 

recent anemia


Gastrointestinal History: Reports: GERD, GI Bleed, PUD


Genitourinary History: Reports: None


Musculoskeletal History: Reports: None


Neurological History: Reports: None


Psychiatric History: Reports: None


Endocrine/Metabolic History: Reports: None


Hematologic History: Reports: Anemia, Blood Transfusion(s)


Other Hematologic History: recently had 2 units transfused


Immunologic History: Reports: None


Oncologic (Cancer) History: Reports: None


Dermatologic History: Reports: None





- Past Surgical History


Head Surgeries/Procedures: Reports: None


HEENT Surgical History: Reports: None


Cardiovascular Surgical History: Reports: None


Respiratory Surgical History: Reports: None


GI Surgical History: Reports: EGD


Male  Surgical History: Reports: None


Endocrine Surgical History: Reports: None


Neurological Surgical History: Reports: None


Musculoskeletal Surgical History: Reports: None


Oncologic Surgical History: Reports: None





- SUBSTANCE USE


Smoking Status *Q: Never Smoker


Recreational Drug Use History: No





- HOME MEDS


Home Medications: 


 Home Meds





Albuterol Sulfate [Proair Hfa] 2 puff INH QID PRN 01/17/17 [History]


Omeprazole 40 mg PO DAILY 01/17/17 [History]


Sucralfate 1 tab PO QID 01/17/17 [History]











- CURRENT (IN HOUSE) MEDS


Current Meds: 





 Current Medications





Acetaminophen (Tylenol)  650 mg PO Q4H PRN


   PRN Reason: Pain (Mild 1-3)/fever


   Last Admin: 09/13/17 20:22 Dose:  650 mg


Albuterol (Ventolin Hfa)  8 gm INH QID PRN


   PRN Reason: Shortness of Breath


Pantoprazole Sodium 80 mg/ (Sodium Chloride)  100 mls @ 10 mls/hr IV Q10H [facility]


   Last Admin: 09/14/17 08:01 Dose:  Not Given


Sodium Chloride (Normal Saline)  1,000 mls @ 125 mls/hr IV ASDIRECTED [facility]


   Last Admin: 09/14/17 02:54 Dose:  125 mls/hr


Morphine Sulfate (Morphine)  2 mg IVPUSH Q2H PRN


   PRN Reason: Pain (severe 7-10)


Ondansetron HCl (Zofran Odt)  8 mg PO Q6H PRN


   PRN Reason: nausea, able to take PO


Oxycodone HCl (Oxycodone)  5 mg PO Q4H PRN


   PRN Reason: Pain (moderate 4-6)


Sodium Chloride (Saline Flush)  10 ml FLUSH ASDIRECTED PRN


   PRN Reason: Keep Vein Open


Sodium Chloride (Saline Flush)  2.5 ml FLUSH ASDIRECTED PRN


   PRN Reason: Keep Vein Open


Temazepam (Restoril)  15 mg PO BEDTIME PRN


   PRN Reason: Sleep





Discontinued Medications





Fentanyl (Sublimaze) Confirm Administered Dose 100 mcg .ROUTE .STK-MED ONE


   Stop: 09/14/17 07:02


Sodium Chloride (Normal Saline)  1,000 mls @ 999 mls/hr IV STAT ONE


   Stop: 09/13/17 09:24


   Last Admin: 09/13/17 08:35 Dose:  999 mls/hr


Pantoprazole Sodium 80 mg/ (Sodium Chloride)  100 mls @ 10 mls/hr IV 

.Continuous JEMMA


Lidocaine HCl (Xylocaine-Mpf 1%) Confirm Administered Dose 5 ml .ROUTE .STK-MED 

ONE


   Stop: 09/14/17 07:04


Midazolam HCl (Versed 1 Mg/Ml) Confirm Administered Dose 2 mg .ROUTE .STK-MED 

ONE


   Stop: 09/14/17 07:02


Ondansetron HCl (Zofran) Confirm Administered Dose 4 mg .ROUTE .STK-MED ONE


   Stop: 09/14/17 07:02


Pantoprazole Sodium (Protonix Iv***)  80 mg IVPUSH .BOLUS ONE


   Stop: 09/13/17 08:25


   Last Admin: 09/13/17 09:01 Dose:  80 mg


Propofol (Diprivan  20 Ml) Confirm Administered Dose 200 mg .ROUTE .STK-MED ONE


   Stop: 09/14/17 07:02


Propofol (Diprivan  20 Ml) Confirm Administered Dose 200 mg .ROUTE .STK-MED ONE


   Stop: 09/14/17 08:04

## 2017-09-14 NOTE — OR
SURGEON:

Lupillo Figueroa M.D.

 

DATE OF PROCEDURE:  09/14/2017

 

OPERATION PERFORMED:

Esophagogastroduodenoscopy with biopsy.

 

ANESTHESIA:

MAC.

 

ASA CLASSIFICATION:

II.

 

PREOPERATIVE DIAGNOSIS:

Upper gastrointestinal bleed with hematemesis and anemia.

 

POSTOPERATIVE DIAGNOSIS:

Mild gastritis.  No blood seen in the upper GI tract.

 

DESCRIPTION OF PROCEDURE:

The patient was taken to the endoscopy room, positioned on the endoscopy table

in the supine position.  Time-out was called for appropriate identification of

the patient and procedure.  Monitored anesthesia care was provided.  The bite

block was placed between the patient's teeth.  The gastroscope was inserted

through the bite block and advanced without difficulty into the duodenum where

examination was now carried out in a retrograde fashion.  No blood was seen

anywhere in the upper GI tract.  The duodenum shows no acute inflammatory

changes or ulcerations.  The stomach shows a very minimal gastritis, if any.

Antral biopsies were again obtained to look for the presence of Helicobacter

pylori.  The gastroscope was retroflexed to visualize the proximal stomach,

where there was a small area of gastritis, but no ulcerations were noted.

Certainly, no blood was seen coming anywhere in the stomach.  The greater and

lesser curvatures were carefully visualized as the scope was withdrawn.  The

scope was retroflexed to visualize the proximal stomach as well.  As the scope

was withdrawn from the stomach, the air was aspirated.  The GE junction was well

defined and shows no acute inflammatory changes.  There was no evidence of

esophageal varices.  I did not see any tear in the esophagus.  The esophagus

demonstrated good contractility.  No mid or proximal lesions were seen.  There

was no evidence of a Zenker's diverticulum.  The vocal cords were visualized as

the scope was withdrawn and noted to move symmetrically.  The gastroscope was

then removed with the patient having tolerated the procedure well.  He was taken

to recovery room in stable condition.

 

 

BLANK / MERVIN

DD:  09/14/2017 08:13:33

DT:  09/14/2017 08:41:10

Job #:  [id number]

## 2017-09-14 NOTE — PCM.OPNOTE
- General Post-Op/Procedure Note


Date of Surgery/Procedure: 09/14/17


Operative Procedure(s): Esophagogastroduodenoscopy with antral biopsies


Pre Op Diagnosis: Upper GI bleed with anemia.  Hematemesis.  Melena.


Post-Op Diagnosis: Mild proximal gastritis.  No ulcerations.  No blood in the 

stomach or duodenum.  No Anna-Swartz tear.  No esophageal varices.


Anesthesia Technique: MAC (ASA II)


Primary Surgeon: Lupillo Figueroa


Assistant: Elba Wood


Condition: Fair


Free Text/Narrative:: 


 Intake & Output











 09/13/17 09/14/17 09/14/17





 19:59 03:59 11:59


 


Intake Total 1500  1116


 


Output Total 1950  1075


 


Balance -450  41








Dictation [id number]





CPT CODE 22575

## 2023-03-16 ENCOUNTER — HOSPITAL ENCOUNTER (EMERGENCY)
Dept: HOSPITAL 56 - MW.ED | Age: 38
LOS: 1 days | Discharge: HOME | End: 2023-03-17
Payer: COMMERCIAL

## 2023-03-16 DIAGNOSIS — R10.13: Primary | ICD-10-CM

## 2023-03-16 LAB
BUN SERPL-MCNC: 11 MG/DL (ref 7–18)
CHLORIDE SERPL-SCNC: 104 MMOL/L (ref 98–107)
CO2 SERPL-SCNC: 27.6 MMOL/L (ref 21–32)
EGFRCR SERPLBLD CKD-EPI 2021: 98 ML/MIN (ref 60–?)
GLUCOSE SERPL-MCNC: 95 MG/DL (ref 74–106)
LIPASE SERPL-CCNC: 142 U/L (ref 73–393)
POTASSIUM SERPL-SCNC: 3.9 MMOL/L (ref 3.5–5.1)
SODIUM SERPL-SCNC: 140 MMOL/L (ref 136–148)

## 2023-03-16 PROCEDURE — 36415 COLL VENOUS BLD VENIPUNCTURE: CPT

## 2023-03-16 PROCEDURE — 93005 ELECTROCARDIOGRAM TRACING: CPT

## 2023-03-16 PROCEDURE — 84484 ASSAY OF TROPONIN QUANT: CPT

## 2023-03-16 PROCEDURE — 85025 COMPLETE CBC W/AUTO DIFF WBC: CPT

## 2023-03-16 PROCEDURE — 99284 EMERGENCY DEPT VISIT MOD MDM: CPT

## 2023-03-16 PROCEDURE — 80053 COMPREHEN METABOLIC PANEL: CPT

## 2023-03-16 PROCEDURE — 81003 URINALYSIS AUTO W/O SCOPE: CPT

## 2023-03-16 PROCEDURE — 83690 ASSAY OF LIPASE: CPT

## 2023-03-17 VITALS — DIASTOLIC BLOOD PRESSURE: 89 MMHG | HEART RATE: 72 BPM | SYSTOLIC BLOOD PRESSURE: 142 MMHG

## 2025-06-17 ENCOUNTER — HOSPITAL ENCOUNTER (EMERGENCY)
Dept: HOSPITAL 56 - MW.ED | Age: 40
Discharge: HOME | End: 2025-06-17
Payer: COMMERCIAL

## 2025-06-17 VITALS — HEART RATE: 62 BPM | DIASTOLIC BLOOD PRESSURE: 72 MMHG | SYSTOLIC BLOOD PRESSURE: 114 MMHG
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
## 2025-06-17 DIAGNOSIS — S61.210A: Primary | ICD-10-CM

## 2025-06-17 PROCEDURE — 73140 X-RAY EXAM OF FINGER(S): CPT

## 2025-06-17 PROCEDURE — 99283 EMERGENCY DEPT VISIT LOW MDM: CPT

## 2025-06-17 RX ADMIN — LIDOCAINE HYDROCHLORIDE ONE ML: 10 INJECTION, SOLUTION EPIDURAL; INFILTRATION; INTRACAUDAL; PERINEURAL at 09:00

## 2025-06-17 RX ADMIN — BUPIVACAINE HYDROCHLORIDE ONE ML: 2.5 INJECTION, SOLUTION EPIDURAL; INFILTRATION; INTRACAUDAL; PERINEURAL at 09:00
